# Patient Record
Sex: MALE | Race: WHITE
[De-identification: names, ages, dates, MRNs, and addresses within clinical notes are randomized per-mention and may not be internally consistent; named-entity substitution may affect disease eponyms.]

---

## 2019-11-07 NOTE — HP
CC:  Dr. Andrew Mejía; Dr. Lebron Murray; Dr. Cristian Stone *

 

ADMISSION HISTORY AND PHYSICAL:

 

DATE OF ADMISSION:  19

 

ATTENDING SURGEON:  Dr. Lebron Mclean.* (DICTATED BY JONAH WILD)

 

CHIEF COMPLAINT:  Rectal carcinoma.

 

HISTORY OF PRESENT ILLNESS:  This is a 64-year-old male who for at least the 
past year had noted intermittent rectal bleeding.  Beginning about 3 months ago
, he noticed blood more consistently with each bowel movement and some 
accompanying pain.  Of late, pain has been more consistent both with and 
between bowel movements.  He was seen and examined by Dr. Mclean on 10/03/19, 
at which time, rectal exam revealed a right-sided posterolateral fixed mass 
within the rectum.  He underwent colonoscopy on 10/10/19 with Dr. Bazan, 
which confirmed the presence of a right-sided posterolateral partial 
circumferential mass, from which biopsies were taken showing an invasive 
moderately differentiated adenocarcinoma.  CT scan from 10/23/19 showed 
cirrhosis as well as splenomegaly and portosystemic shunt/varices.  Also noted 
were mild diverticulosis and a probable enchondroma in the right femur.  A 
lesion was confirmed in the distal rectum extending to the anus and with some 
sparing on the left side consistent with known carcinoma, one lymph node was 
seen posteriorly in the mesorectum measuring 0.6 cm.  The patient did undergo 
an MRI today but that report is not yet available.  He has been seen by both 
medical and radiation oncologists and was initiated by Dr. Murray on extended- 
release morphine sulfate with improvement.  He was noted to be mildly anemic on 
recent lab work from 10/10/19.  His case was reviewed at [facility] and he has 
been recommended for neoadjuvant chemoradiation therapy, which is planned to 
start on 19.  Dr. Mclean has discussed with him the indications for port 
placement as well as the risks and alternatives.  The patient elected to 
proceed as scheduled with placement of a power port.  The patient's family 
history is negative for colorectal cancer, though was positive for ovarian 
cancer in his mother.

 

PAST MEDICAL HISTORY:

1.  Hepatitis C (apparently with active disease despite two separate treatment 
regimens with interferon. Most recent liver function tests showed normal 
transaminases, though CT findings are noted as above).

2.  Hypertension (currently untreated).

3.  Glaucoma.

4.  Peripheral edema.

5.  Obesity.

 

(The patient has not seen his primary care provider in 3 to 4 years).

 

PAST SURGICAL HISTORY:  Previous surgeries include exploratory laparotomy for 
ruptured appendicitis.

 

CURRENT MEDICATIONS:

1.  Latanoprost 0.005% one drop each eye once daily.

2.  Timolol 0.5% one drop each eye once daily.

 

DRUG ALLERGIES:  None known.

 

FAMILY HISTORY:  As noted above.  Father  from CHF.  No known family 
history of anesthesia problems, bleeding or clotting disorders.

 

SOCIAL HISTORY:  The patient is single and lives alone.  He is employed as a 
. He did smoke up to 1 pack per day many years ago and then 
intermittently until he quit in .  He drinks 2 beers per week.  He denies 
any other recreational drug use.

 

REVIEW OF SYSTEMS:  General:  No recent constitutional symptoms or acute 
illnesses other than described in the HPI.  He states that his weight has been 
relatively stable.  HEENT:  No problems reported.  No recent visual changes.  
Cardiovascular: No chest pain, palpitations, or history of heart murmur.  He 
has been treated for hypertension in the past but not lately.  Respiratory:  No 
recent cough or shortness of breath.  No respiratory complaints.  GI:  As above 
per HPI.  No upper GI complaints.  Apparently active hepatitis C.  :  No 
problems reported. Endocrine:  No diabetes or thyroid dysfunction.  
Musculoskeletal:  No complaints.

 

                               PHYSICAL EXAMINATION

 

GENERAL:  Well-nourished obese male in no acute distress.

 

VITAL SIGNS:  Height 5 feet 9 inches, weight 245 pounds.  Other vital signs per 
nursing.

 

HEENT:  Pupils equal, round, and reactive.  EOMS intact.  No conjunctival 
pallor. Oropharynx:  Teeth in fair repair.  Some missing teeth.  No intraoral 
lesions.

 

NECK:  No lymphadenopathy in the cervical or supraclavicular regions.  No 
thyromegaly or masses.

 

LUNGS:  Clear to auscultation.  No rales or wheezes.

 

HEART:  Regular rate and rhythm.  No murmur noted.

 

ABDOMEN:  Well-healed lower midline incision.  Soft, nontender to palpation.  
No palpable masses or organomegaly, though exam limited by body habitus.

 

BACK:  No spinous process or CVA tenderness.

 

EXTREMITIES:  No obvious edema.  He does have compression stockings in place 
bilaterally per the patient.  No open lesions in the lower extremities.

 

GENITALIA:  Not done.

 

RECTAL:  Not done.

 

NEUROLOGICAL:  Grossly intact.

 

SKIN:  Warm and dry.  No suspicious rashes or lesions noted.

 

 IMPRESSION:  Rectal carcinoma.

 

PLAN:  Power port placement for neoadjuvant chemotherapy.

 

 ____________________________________ JONAH WILD

 

[id number]/[facility] #: [id number]

MTDD

## 2019-11-11 ENCOUNTER — HOSPITAL ENCOUNTER (OUTPATIENT)
Dept: HOSPITAL 25 - OR | Age: 64
Discharge: HOME | End: 2019-11-11
Attending: SURGERY
Payer: COMMERCIAL

## 2019-11-11 VITALS — DIASTOLIC BLOOD PRESSURE: 70 MMHG | SYSTOLIC BLOOD PRESSURE: 139 MMHG

## 2019-11-11 DIAGNOSIS — I10: ICD-10-CM

## 2019-11-11 DIAGNOSIS — B19.20: ICD-10-CM

## 2019-11-11 DIAGNOSIS — C20: Primary | ICD-10-CM

## 2019-11-11 DIAGNOSIS — E66.9: ICD-10-CM

## 2019-11-11 DIAGNOSIS — K74.60: ICD-10-CM

## 2019-11-11 DIAGNOSIS — R60.0: ICD-10-CM

## 2019-11-11 PROCEDURE — 76000 FLUOROSCOPY <1 HR PHYS/QHP: CPT

## 2019-11-11 PROCEDURE — C1788 PORT, INDWELLING, IMP: HCPCS

## 2019-11-12 NOTE — OP
CC:  Cristian Stone MD, Andrew Mejía MD *

 

DATE OF OPERATION:  11/11/19 - [facility]

 

DATE OF BIRTH:  [birth year]

 

SURGEON:  Lebron Mclean MD

 

ASSISTANT:  None.

 

ANESTHESIOLOGIST:  Dr. Kelly.

 

ANESTHESIA:  Local MAC.

 

PRE-OP DIAGNOSIS:  Rectal carcinoma.

 

POST-OP DIAGNOSIS:  Rectal carcinoma.

 

OPERATIVE PROCEDURE:  PowerPort placement, left subclavian.

 

ESTIMATED BLOOD LOSS:  Minimal.

 

IV FLUIDS:  Crystalloid.

 

SPECIMEN:  None.

 

DRAINS:  None.

 

COMPLICATIONS:  None.

 

COUNTS:  The instrument, needle, and sponge counts were correct.

 

DESCRIPTION OF PROCEDURE:  The patient was brought to the operating room and 
placed on the table supine.  Sequential compression devices were placed in both 
lower extremities.  Intravenous sedation was administered.  He was positioned 
and padded appropriately.  He was prepped and draped in the usual sterile 
fashion.  He received appropriate intravenous antibiotics.  A time-out was 
performed.

 

Local anesthetic was infiltrated into the skin and soft tissue prior to making 
each incision.  Local anesthetic was infiltrated for a left subclavian 
approach.  The patient was positioned in Trendelenburg and then using an 18-
gauge needle, the left subclavian vein was accessed on the first pass.  The 
guidewire was advanced into the superior vena cava under fluoroscopic guidance.
  The additional local anesthetic was infiltrated to the upper left chest and a 
transverse incision was created and using cautery dissection, a subcutaneous 
pocket was created.  A counterincision was made at the guidewire insertion site 
and then an 8-[place]aman[place] PowerPort catheter was back tunneled to the pocket.  A peel
-away sheath and dilator were advanced over the guidewire into the superior 
vena cava under fluoroscopic guidance.  Dilator and the guidewire were removed 
and then the catheter tip was advanced to the atriocaval junction.  A peel-away 
sheath was removed.  The catheter tip was again confirmed prior to cutting the 
catheter to appropriate length, which was then connected to the PowerPort and 
placed into the subcutaneous pocket.  It was drawn and flushed without 
difficulty.  The port was secured into the pocket with 2 sutures of 2-0 
Prolene.  The pocket was closed with two layers using 3-0 Vicryl for the 
subcutaneous tissues, 4-0 Monocryl for the skin.  Counterincision was closed 
with 4-0 Monocryl.  The port was accessed.  It was flushed with heparinized 
saline.  Steri-Strips were applied.  The access needle was placed and then 
Tegaderm dressing was placed over this.  The patient tolerated the procedure 
well and was awakened and transferred to Recovery stable.

 

 [id number]/[facility] #: [id number]

ZOILA

## 2020-04-08 ENCOUNTER — HOSPITAL ENCOUNTER (INPATIENT)
Dept: HOSPITAL 25 - CHOA | Age: 65
LOS: 7 days | Discharge: HOME | DRG: 391 | End: 2020-04-15
Attending: INTERNAL MEDICINE | Admitting: INTERNAL MEDICINE
Payer: MEDICARE

## 2020-04-08 DIAGNOSIS — C20: ICD-10-CM

## 2020-04-08 DIAGNOSIS — E87.70: ICD-10-CM

## 2020-04-08 DIAGNOSIS — Z79.899: ICD-10-CM

## 2020-04-08 DIAGNOSIS — K74.60: ICD-10-CM

## 2020-04-08 DIAGNOSIS — E66.9: ICD-10-CM

## 2020-04-08 DIAGNOSIS — K92.2: ICD-10-CM

## 2020-04-08 DIAGNOSIS — D61.810: ICD-10-CM

## 2020-04-08 DIAGNOSIS — K52.89: ICD-10-CM

## 2020-04-08 DIAGNOSIS — T45.1X5A: ICD-10-CM

## 2020-04-08 DIAGNOSIS — A09: Primary | ICD-10-CM

## 2020-04-08 DIAGNOSIS — Z92.21: ICD-10-CM

## 2020-04-08 DIAGNOSIS — E87.6: ICD-10-CM

## 2020-04-08 DIAGNOSIS — R18.8: ICD-10-CM
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
## 2020-04-08 DIAGNOSIS — D50.9: ICD-10-CM

## 2020-04-08 DIAGNOSIS — I10: ICD-10-CM

## 2020-04-08 DIAGNOSIS — Z92.3: ICD-10-CM

## 2020-04-08 DIAGNOSIS — E83.42: ICD-10-CM

## 2020-04-08 DIAGNOSIS — I73.9: ICD-10-CM

## 2020-04-08 DIAGNOSIS — H40.9: ICD-10-CM

## 2020-04-08 LAB
ALBUMIN SERPL BCG-MCNC: 3 G/DL (ref 3.2–5.2)
ALBUMIN/GLOB SERPL: 1 {RATIO} (ref 1–3)
ALP SERPL-CCNC: 119 U/L (ref 34–104)
ALT SERPL W P-5'-P-CCNC: 34 U/L (ref 7–52)
ANION GAP SERPL CALC-SCNC: 5 MMOL/L (ref 2–11)
AST SERPL-CCNC: 43 U/L (ref 13–39)
BASOPHILS # BLD AUTO: 0 10^3/UL (ref 0–0.2)
BUN SERPL-MCNC: 22 MG/DL (ref 6–24)
BUN/CREAT SERPL: 28.2 (ref 8–20)
CALCIUM SERPL-MCNC: 8.7 MG/DL (ref 8.6–10.3)
CHLORIDE SERPL-SCNC: 105 MMOL/L (ref 101–111)
EOSINOPHIL # BLD AUTO: 0.1 10^3/UL (ref 0–0.6)
FERRITIN SERPL IA-MCNC: 75.5 NG/ML (ref 24–336)
GLOBULIN SER CALC-MCNC: 3.1 G/DL (ref 2–4)
GLUCOSE SERPL-MCNC: 172 MG/DL (ref 70–100)
HCO3 SERPL-SCNC: 24 MMOL/L (ref 22–32)
HCT VFR BLD AUTO: 25 % (ref 42–52)
HGB BLD-MCNC: 8.3 G/DL (ref 14–18)
IRON SERPL-MCNC: 39 UG/DL (ref 50–212)
LYMPHOCYTES # BLD AUTO: 0.4 10^3/UL (ref 1–4.8)
MAGNESIUM SERPL-MCNC: 1.9 MG/DL (ref 1.9–2.7)
MCH RBC QN AUTO: 33 PG (ref 27–31)
MCHC RBC AUTO-ENTMCNC: 34 G/DL (ref 31–36)
MCV RBC AUTO: 99 FL (ref 80–94)
MONOCYTES # BLD AUTO: 0.9 10^3/UL (ref 0–0.8)
NEUTROPHILS # BLD AUTO: 0.7 10^3/UL (ref 1.5–7.7)
NRBC # BLD AUTO: 0 10^3/UL
NRBC BLD QL AUTO: 2
PLATELET # BLD AUTO: 142 10^3/UL (ref 150–450)
POTASSIUM SERPL-SCNC: 4 MMOL/L (ref 3.5–5)
PROT SERPL-MCNC: 6.1 G/DL (ref 6.4–8.9)
RBC # BLD AUTO: 2.48 10^6 /UL (ref 4.18–5.48)
SODIUM SERPL-SCNC: 134 MMOL/L (ref 135–145)
TIBC SERPL-MCNC: 283 MCG/DL (ref 250–450)
TRANSFERRIN SERPL-MCNC: 202 MG/DL (ref 203–362)
WBC # BLD AUTO: 2.1 10^3/UL (ref 3.5–10.8)

## 2020-04-08 PROCEDURE — 86850 RBC ANTIBODY SCREEN: CPT

## 2020-04-08 PROCEDURE — 87046 STOOL CULTR AEROBIC BACT EA: CPT

## 2020-04-08 PROCEDURE — 87045 FECES CULTURE AEROBIC BACT: CPT

## 2020-04-08 PROCEDURE — 83550 IRON BINDING TEST: CPT

## 2020-04-08 PROCEDURE — 99232 SBSQ HOSP IP/OBS MODERATE 35: CPT

## 2020-04-08 PROCEDURE — 80051 ELECTROLYTE PANEL: CPT

## 2020-04-08 PROCEDURE — 82272 OCCULT BLD FECES 1-3 TESTS: CPT

## 2020-04-08 PROCEDURE — 82728 ASSAY OF FERRITIN: CPT

## 2020-04-08 PROCEDURE — 99233 SBSQ HOSP IP/OBS HIGH 50: CPT

## 2020-04-08 PROCEDURE — 74177 CT ABD & PELVIS W/CONTRAST: CPT

## 2020-04-08 PROCEDURE — 87899 AGENT NOS ASSAY W/OPTIC: CPT

## 2020-04-08 PROCEDURE — 83540 ASSAY OF IRON: CPT

## 2020-04-08 PROCEDURE — 83735 ASSAY OF MAGNESIUM: CPT

## 2020-04-08 PROCEDURE — 71260 CT THORAX DX C+: CPT

## 2020-04-08 PROCEDURE — 86900 BLOOD TYPING SEROLOGIC ABO: CPT

## 2020-04-08 PROCEDURE — 86901 BLOOD TYPING SEROLOGIC RH(D): CPT

## 2020-04-08 PROCEDURE — 87040 BLOOD CULTURE FOR BACTERIA: CPT

## 2020-04-08 PROCEDURE — 72197 MRI PELVIS W/O & W/DYE: CPT

## 2020-04-08 PROCEDURE — A9579 GAD-BASE MR CONTRAST NOS,1ML: HCPCS

## 2020-04-08 PROCEDURE — 99239 HOSP IP/OBS DSCHRG MGMT >30: CPT

## 2020-04-08 PROCEDURE — 87493 C DIFF AMPLIFIED PROBE: CPT

## 2020-04-08 PROCEDURE — 99223 1ST HOSP IP/OBS HIGH 75: CPT

## 2020-04-08 PROCEDURE — 80048 BASIC METABOLIC PNL TOTAL CA: CPT

## 2020-04-08 PROCEDURE — P9040 RBC LEUKOREDUCED IRRADIATED: HCPCS

## 2020-04-08 PROCEDURE — 80053 COMPREHEN METABOLIC PANEL: CPT

## 2020-04-08 PROCEDURE — 36415 COLL VENOUS BLD VENIPUNCTURE: CPT

## 2020-04-08 PROCEDURE — 85025 COMPLETE CBC W/AUTO DIFF WBC: CPT

## 2020-04-08 PROCEDURE — 86922 COMPATIBILITY TEST ANTIGLOB: CPT

## 2020-04-08 PROCEDURE — 76705 ECHO EXAM OF ABDOMEN: CPT

## 2020-04-08 RX ADMIN — ENOXAPARIN SODIUM SCH MG: 40 INJECTION SUBCUTANEOUS at 14:36

## 2020-04-08 RX ADMIN — PIPERACILLIN AND TAZOBACTAM SCH MLS/HR: 3; .375 INJECTION, POWDER, LYOPHILIZED, FOR SOLUTION INTRAVENOUS; PARENTERAL at 22:52

## 2020-04-08 RX ADMIN — SODIUM CHLORIDE SCH MLS/HR: 900 IRRIGANT IRRIGATION at 21:37

## 2020-04-08 RX ADMIN — SODIUM CHLORIDE SCH MLS/HR: 900 IRRIGANT IRRIGATION at 13:13

## 2020-04-08 NOTE — CONS
CC:  Dr. Cristian Stone; Primary Care Doctor; [facility]; Dr. Lebron Murray; Dr. Litzy Mejias,
 Colorectal Surgeon, [facility]

 

SURGICAL CONSULTATION REPORT:

 

DATE OF CONSULT:  04/08/20

 

HISTORY OF PRESENT ILLNESS:  I was contacted by the oncology service to evaluate Mr. Mckeon, a 65-year
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
t [facility], who was direct admitted by the oncology service for worsening abdominal pain a
s well as syncope and anemia.  The patient did undergo evaluation by the oncologist earlier today, wh
ich included rectal exam and the patient refused rectal exam today by me.

 

The patient describes last chemotherapy of FOLFOX just over 2 weeks ago, started to feel poorly about
 a week ago on Thursday.  It was accompanied by decreased appetite.  He described pain in the mid abd
omen that persisted, did not have aggravating factors of eating or drinking; however, the patient did
 have decreased intake.

 

Over the weekend, he woke up and had bloody bowel movements that he described as melena, but sound as
 though they had blood clot in them.  It was accompanied with flatus.  Later on, the patient had a sy
ncopal episode.  The patient lives alone. He had woken up later, not sure how long afterwards, incont
inent of urine.  He slowly brought himself up to his bed where he slept it off.  He missed his follow
up appointment with Oncology earlier this week and again was seen today in their offices.

 

The patient describes the abdominal pain as severe.  It does wax and wane, lasting at times for over 
a minute of severe pain where he feels like something is trying to move through a stricture or some t
ightness.  He has had 1 episode of vomiting, intermittent nausea, as well as dry heaves at times.  Th
is past week, he has been passing blood as well as intermittent semi-formed bowel movements.  He is p
assing flatus.  He denies fever, but has been checking his temperature on his home thermometer where 
it has been 96, which he felt is not accurate, but does not state that he had chills or felt warm.

 

The patient had rectal bleeding for many years.  He thought it was secondary to hemorrhoids.  He did 
undergo a sigmoidoscopy in October of last year where he was noted to have a near obstructing bleedin
g rectal lesion.  The patient has been seen by colorectal surgeons where he made it clear that he did
 not want to undergo a colostomy if he did not have to.  He was started on neoadjuvant chemotherapy, 
which continues.  Radiation therapy treatments have been completed, and the patient has no new follow
ups with colorectal surgeon in [place] at this point.

 

The patient denies any hernias.  He lies down for relief of this pain and again it will pass with angella
e.  It is nonradiating.  It is 10/10.

 

PAST MEDICAL HISTORY:  Hepatitis C, cirrhosis, glaucoma, hypertension, obesity, peripheral vascular d
isease.

 

PAST SURGICAL HISTORY:  Exploratory laparotomy, appendectomy for ruptured appendix.

 

MEDICATIONS:  Include:

1.  Amlodipine.

2.  Multivitamin.

 

Again, the FOLFOX completed 2 weeks ago.

 

ALLERGIES:  No known drug allergies.

 

FAMILY HISTORY:  Cancers.

 

SOCIAL HISTORY:  He lives alone.  He does not smoke.  He drinks occasionally.  I do not believe there
 are any surrogate decision makers.

 

REVIEW OF SYSTEMS:  No shortness of breath.  He is tired.  Syncopal event as described.  Abdominal pa
in as described.  No dysuria.  History of significant thrush earlier this year, but not bothering him
 at this time.  No depression.

 

PHYSICAL EXAM:  Temperature 97.4, blood pressure 167/70, heart rate 94, respirations 18, O2 sat of 10
0% on room air.  He is alert, sitting in bed, pale, in mild distress.  Head, ears, eyes, nose, and th
roat:  Normocephalic, atraumatic. Sclerae anicteric.  Mucous membranes dry.  Neck:  No lymphadenopath
y.  Abdomen: Soft, obese.  Minimally tender on deep palpation at the periumbilical site.  Well- heale
d midline incision.  No hernias noted.  No skin changes.  No CVA tenderness. Rectal exam shows no annemarie
lulitis in the perianal area.  The patient refused digital rectal exam, however, today.  Hairless con
sistent with radiation treatment and no protruding mass appreciated.  Extremities with hemosiderin st
aining bilaterally consistent with venous stasis disease.

 

DIAGNOSTIC STUDIES/LAB DATA:  Labs today show white count of 2.1, H and H 8.3/25 which is down from 2
9 just 2 weeks ago, platelet count 142 which is significant improvement than when he was on additiona
l chemotherapy where it was much lower. Glucose 172.  Total bilirubin 1.9.  Albumin 3.0.

 

The patient is drinking for the CAT scan.  No imaging is available at this time.

 

IMPRESSION AND PLAN:  A 65-year-old gentleman with a large rectal lesion on workup last year, with kn
own rectal cancer, undergoing neoadjuvant chemotherapy, who now presents with a week of abdominal petra
n along with anemia, recent syncopal episode. Surgical differential diagnosis, concern for perirectal
 abscesses that can occur with such lesions, although I do not feel this is the diagnosis, although I
 was not able to examine him today as the patient refused.  Partial small bowel obstruction secondary
 to previous surgery and potential mass effect, but this is not something we would operate on at this
 time.  I will look forward to a CAT scan of the abdomen and pelvis.  We are concerned with a near ob
structing lesion and the possibility of proximal perforation.  I do not believe the patient has this 
judging by his abdominal exam today.  He may suffer with dehydration and anemia and subsequent mesent
indiana angina.  My recommendation is hydration, possible blood transfusion, GI prophylaxis, obtain CT s
can and serial abdominal exams, pain control.  I discussed this with the patient.  We also spent a fa
ir amount of time talking today about recommendation for surgical intervention namely APR.  The patie
nt is somewhat fearful of this procedure, but I think our discussion today was somewhat helpful in th
at direction, it has given something else for the patient to think about.  Again, we will continue to
 follow.  We will leave antibiotics at the discretion of the primary team unless something should brooklyn
nge with the findings on the CT scan.

 

 [id number]/[facility] #: [id number]

## 2020-04-09 LAB
ALBUMIN SERPL BCG-MCNC: 2.5 G/DL (ref 3.2–5.2)
ALBUMIN/GLOB SERPL: 0.9 {RATIO} (ref 1–3)
ALP SERPL-CCNC: 100 U/L (ref 34–104)
ALT SERPL W P-5'-P-CCNC: 26 U/L (ref 7–52)
ANION GAP SERPL CALC-SCNC: 5 MMOL/L (ref 2–11)
AST SERPL-CCNC: 31 U/L (ref 13–39)
BASOPHILS # BLD AUTO: 0 10^3/UL (ref 0–0.2)
BUN SERPL-MCNC: 22 MG/DL (ref 6–24)
BUN/CREAT SERPL: 26.8 (ref 8–20)
CALCIUM SERPL-MCNC: 7.9 MG/DL (ref 8.6–10.3)
CHLORIDE SERPL-SCNC: 108 MMOL/L (ref 101–111)
EOSINOPHIL # BLD AUTO: 0.2 10^3/UL (ref 0–0.6)
GLOBULIN SER CALC-MCNC: 2.7 G/DL (ref 2–4)
GLUCOSE SERPL-MCNC: 141 MG/DL (ref 70–100)
HCO3 SERPL-SCNC: 22 MMOL/L (ref 22–32)
HCT VFR BLD AUTO: 21 % (ref 42–52)
HGB BLD-MCNC: 7.1 G/DL (ref 14–18)
LYMPHOCYTES # BLD AUTO: 0.4 10^3/UL (ref 1–4.8)
MAGNESIUM SERPL-MCNC: 1.8 MG/DL (ref 1.9–2.7)
MCH RBC QN AUTO: 34 PG (ref 27–31)
MCHC RBC AUTO-ENTMCNC: 34 G/DL (ref 31–36)
MCV RBC AUTO: 98 FL (ref 80–94)
MONOCYTES # BLD AUTO: 0.9 10^3/UL (ref 0–0.8)
NEUTROPHILS # BLD AUTO: 0.5 10^3/UL (ref 1.5–7.7)
NRBC # BLD AUTO: 0 10^3/UL
NRBC BLD QL AUTO: 1
PLATELET # BLD AUTO: 128 10^3/UL (ref 150–450)
POTASSIUM SERPL-SCNC: 3.6 MMOL/L (ref 3.5–5)
PROT SERPL-MCNC: 5.2 G/DL (ref 6.4–8.9)
RBC # BLD AUTO: 2.13 10^6 /UL (ref 4.18–5.48)
SODIUM SERPL-SCNC: 135 MMOL/L (ref 135–145)
WBC # BLD AUTO: 2.1 10^3/UL (ref 3.5–10.8)

## 2020-04-09 PROCEDURE — 30233N1 TRANSFUSION OF NONAUTOLOGOUS RED BLOOD CELLS INTO PERIPHERAL VEIN, PERCUTANEOUS APPROACH: ICD-10-PCS | Performed by: INTERNAL MEDICINE

## 2020-04-09 RX ADMIN — ENOXAPARIN SODIUM SCH MG: 40 INJECTION SUBCUTANEOUS at 12:16

## 2020-04-09 RX ADMIN — SODIUM CHLORIDE SCH MLS/HR: 900 IRRIGANT IRRIGATION at 15:12

## 2020-04-09 RX ADMIN — SODIUM CHLORIDE SCH MLS/HR: 900 IRRIGANT IRRIGATION at 21:57

## 2020-04-09 RX ADMIN — PIPERACILLIN AND TAZOBACTAM SCH MLS/HR: 3; .375 INJECTION, POWDER, LYOPHILIZED, FOR SOLUTION INTRAVENOUS; PARENTERAL at 06:20

## 2020-04-09 RX ADMIN — PIPERACILLIN AND TAZOBACTAM SCH MLS/HR: 3; .375 INJECTION, POWDER, LYOPHILIZED, FOR SOLUTION INTRAVENOUS; PARENTERAL at 21:58

## 2020-04-09 RX ADMIN — SODIUM CHLORIDE SCH MLS/HR: 900 IRRIGANT IRRIGATION at 04:35

## 2020-04-09 RX ADMIN — PIPERACILLIN AND TAZOBACTAM SCH MLS/HR: 3; .375 INJECTION, POWDER, LYOPHILIZED, FOR SOLUTION INTRAVENOUS; PARENTERAL at 15:13

## 2020-04-09 NOTE — PN
Progress Note





- Progress Note


Date of Service: 04/09/20


SOAP: 


Subjective:


[Feels better than he did yesterday.  Was incontinent of watery stool several 

times overnight, no gross blood or melena however.  Tmax 100.0F at the time he 

reached the floor yesterday.  No n/v.  Hungry and would like to eat.  Abdominal 

pain comes in waves, nothing consistent.]








Objective:


[


Vital Signs:











Temp Pulse Resp BP Pulse Ox


 


 98.7 F   102   16   149/67   100 


 


 04/09/20 08:00  04/09/20 08:00  04/09/20 08:00  04/09/20 08:00  04/09/20 08:00

















Acetaminophen (Tylenol Tab*)  650 mg PO Q4H PRN


   PRN Reason: PAIN - MILD


Enoxaparin Sodium (Lovenox(*))  40 mg SUBCUT Q24H [facility]


   Last Admin: 04/09/20 12:16 Dose:  40 mg


Sodium Chloride (Ns 0.9% 1000 Ml**)  1,000 mls @ 150 mls/hr IV PER RATE [facility]


   Last Admin: 04/09/20 04:35 Dose:  150 mls/hr


Piperacillin Sod/Tazobactam (Sod 3.375 gm/ Sodium Chloride)  100 mls @ 25 mls/

hr IVPB Q8H [facility]


   Last Admin: 04/09/20 06:20 Dose:  25 mls/hr


Ondansetron HCl (Zofran Inj*)  4 mg IV Q4H PRN


   PRN Reason: NAUSEA/VOMITING


Oxycodone/Acetaminophen (Percocet 5/325 Tab*)  1 tab PO Q4H PRN


   PRN Reason: PAIN - MODERATE


Pharmacy Consult (Zosyn Per Pharmacy*)  1 note FOLLOW UP .ZOSYN PER PHARMACY [facility]








 Laboratory Results - last 24 hr











  04/08/20 04/09/20 04/09/20





  10:30 06:03 06:14


 


WBC    2.1 L


 


RBC    2.13 L


 


Hgb    7.1 L


 


Hct    21 L


 


MCV    98 H


 


MCH    34 H


 


MCHC    34


 


RDW    21 H


 


Plt Count    128 L


 


MPV    8.5


 


Neut % (Auto)    26.3


 


Lymph % (Auto)    18.1


 


Mono % (Auto)    43.5


 


Eos % (Auto)    11.2


 


Baso % (Auto)    0.9


 


Absolute Neuts (auto)    0.5 L


 


Absolute Lymphs (auto)    0.4 L


 


Absolute Monos (auto)    0.9 H


 


Absolute Eos (auto)    0.2


 


Absolute Basos (auto)    0.0


 


Absolute Nucleated RBC    0.0


 


Nucleated RBC %    1.0


 


Sodium   135 


 


Potassium   3.6 


 


Chloride   108 


 


Carbon Dioxide   22 


 


Anion Gap   5 


 


BUN   22 


 


Creatinine   0.82 


 


Est GFR ( Amer)   114.1 


 


Est GFR (Non-Af Amer)   94.3 


 


BUN/Creatinine Ratio   26.8 H 


 


Glucose   141 H 


 


Calcium   7.9 L 


 


Magnesium   1.8 L 


 


Total Bilirubin   1.60 H 


 


AST   31 


 


ALT   26 


 


Alkaline Phosphatase   100 


 


Total Protein   5.2 L 


 


Albumin   2.5 L 


 


Globulin   2.7 


 


Albumin/Globulin Ratio   0.9 L 


 


Blood Type  O Positive  


 


Antibody Screen   


 


Crossmatch   














  04/09/20





  06:14


 


WBC 


 


RBC 


 


Hgb 


 


Hct 


 


MCV 


 


MCH 


 


MCHC 


 


RDW 


 


Plt Count 


 


MPV 


 


Neut % (Auto) 


 


Lymph % (Auto) 


 


Mono % (Auto) 


 


Eos % (Auto) 


 


Baso % (Auto) 


 


Absolute Neuts (auto) 


 


Absolute Lymphs (auto) 


 


Absolute Monos (auto) 


 


Absolute Eos (auto) 


 


Absolute Basos (auto) 


 


Absolute Nucleated RBC 


 


Nucleated RBC % 


 


Sodium 


 


Potassium 


 


Chloride 


 


Carbon Dioxide 


 


Anion Gap 


 


BUN 


 


Creatinine 


 


Est GFR ( Amer) 


 


Est GFR (Non-Af Amer) 


 


BUN/Creatinine Ratio 


 


Glucose 


 


Calcium 


 


Magnesium 


 


Total Bilirubin 


 


AST 


 


ALT 


 


Alkaline Phosphatase 


 


Total Protein 


 


Albumin 


 


Globulin 


 


Albumin/Globulin Ratio 


 


Blood Type  O Positive


 


Antibody Screen  Negative


 


Crossmatch  See Detail








Exam


Gen: ill appearing 64 yo male in NAD


HEENT: MMM


CV: RRR, III/VI murmur


Resp: CTA


Abd: midly distended but nonTTP


Ext: no edema]








Assessment:


[This is a 64 yo male with rectal CA currently receiving neoadjuvant FOLFOX who 

presented yesterday to the oncology suite after several days of BRBPR and 

melena with associated abdominal pain.  CT demonstrates mural wall thickening 

of the ascending and transverse colon c/w a colitis.  Ddx. infectious colitis, 

neutropenic colitis (typhilitis).  He remains neutropenic, but afebrile.  Now 

day 18 of C4.  ]








Plan:


[1. Colitis


- surgical consultation is appreciated


- eval for C.diff and send for stool culture to identify alternative pathogens


- cont Zosyn and IVF


- advance to clear liquid diet


2. Pancytopenia secondary to chemotherapy


- his degree of neuropenia is late for FOLFOX, but likely associated with his 

acute illness - hold on initiating GCSF at this time but will consider if 

fevers develop or neutropenia is prolonged


- degree of anemia is certainly related to bleeding from the colon, drop 

overnight likely due to dilution from IVF - give 1UPRBCs today


3. Rectal CA


- s/p C4 neoadjuvant FOLFOX following 5FU/XRT which was c/b grade 3 mucositis





Dispo: requires continued inpatient care]

## 2020-04-09 NOTE — PN
Progress Note





- Progress Note


Date of Service: 04/09/20


Note: 





Surgery Progress Note








S: Hebert is feeling better this morning than yesterday.  His abdominal pain 

has improved.  He is having more watery diarrhea however.  





O:


 Vital Signs - 24 hr











  04/08/20 04/08/20 04/08/20





  13:15 16:08 16:10


 


Temperature 100.0 F  97.4 F


 


Pulse Rate 99  94


 


Respiratory 16 16 18





Rate   


 


Blood Pressure 146/70  167/70





(mmHg)   


 


O2 Sat by Pulse 100  100





Oximetry   














  04/08/20 04/08/20 04/08/20





  19:41 20:08 20:50


 


Temperature  98.1 F 


 


Pulse Rate  95 


 


Respiratory 18 18 18





Rate   


 


Blood Pressure  136/67 





(mmHg)   


 


O2 Sat by Pulse  99 





Oximetry   














  04/08/20 04/08/20 04/09/20





  21:05 23:01 01:20


 


Temperature  97.9 F 


 


Pulse Rate  95 


 


Respiratory 18 18 18





Rate   


 


Blood Pressure  135/70 





(mmHg)   


 


O2 Sat by Pulse  100 





Oximetry   














  04/09/20 04/09/20





  04:19 08:00


 


Temperature 98.0 F 98.7 F


 


Pulse Rate 98 102


 


Respiratory 18 16





Rate  


 


Blood Pressure 111/49 149/67





(mmHg)  


 


O2 Sat by Pulse 96 100





Oximetry  








 Intake & Output











 04/08/20 04/09/20 04/09/20





 22:59 06:59 14:59


 


Intake Total 1086 1083 


 


Output Total  300 0


 


Balance 1086 783 0


 


Weight 217 lb  


 


Intake:   


 


  IV Fluids 1086 980 


 


    ABX - PIPERACILLIN 106  


 


    NS (0.9%) 980 980 


 


  IVPB  103 


 


    ABX - PIPERACILLIN  103 


 


  Oral 0  


 


Output:   


 


  Urine  300 0


 


Other:   


 


  Estimated Stool Amount Medium  








 Laboratory Results - last 24 hr











  04/08/20 04/09/20 04/09/20





  10:30 06:03 06:14


 


WBC    2.1 L


 


RBC    2.13 L


 


Hgb    7.1 L


 


Hct    21 L


 


MCV    98 H


 


MCH    34 H


 


MCHC    34


 


RDW    21 H


 


Plt Count    128 L


 


MPV    8.5


 


Neut % (Auto)    26.3


 


Lymph % (Auto)    18.1


 


Mono % (Auto)    43.5


 


Eos % (Auto)    11.2


 


Baso % (Auto)    0.9


 


Absolute Neuts (auto)    0.5 L


 


Absolute Lymphs (auto)    0.4 L


 


Absolute Monos (auto)    0.9 H


 


Absolute Eos (auto)    0.2


 


Absolute Basos (auto)    0.0


 


Absolute Nucleated RBC    0.0


 


Nucleated RBC %    1.0


 


Sodium  134 L  135 


 


Potassium  4.0  3.6 


 


Chloride  105  108 


 


Carbon Dioxide  24  22 


 


Anion Gap  5  5 


 


BUN  22  22 


 


Creatinine  0.78  0.82 


 


Est GFR ( Amer)  120.9  114.1 


 


Est GFR (Non-Af Amer)  99.9  94.3 


 


BUN/Creatinine Ratio  28.2 H  26.8 H 


 


Glucose  172 H  141 H 


 


Calcium  8.7  7.9 L 


 


Magnesium  1.9  1.8 L 


 


Iron  39 L  


 


TIBC  283  


 


% Saturation  14 L  


 


Unsat Iron Binding  < 268  


 


Transferrin  202 L  


 


Ferritin  75.5  


 


Total Bilirubin  1.90 H  1.60 H 


 


AST  43 H  31 


 


ALT  34  26 


 


Alkaline Phosphatase  119 H  100 


 


Total Protein  6.1 L  5.2 L 


 


Albumin  3.0 L  2.5 L 


 


Globulin  3.1  2.7 


 


Albumin/Globulin Ratio  1.0  0.9 L 











Physical exam:


General- frail appearing man lying in bed, no apparent distress


Abdomen- obese, no tenderness to deep palpation


Ext- no edema





A/P: 65 M with rectal cancer, sp cycle 4 of FOLFOX over 2 weeks ago who was 

admitted by Dr. Stone from the office yesterday for worsening fatigue, weakness, 

abdominal pain.





- I have reviewed patient's imaging from yesterday, including gallbladder US 

and CT abdomen/pelvis.   On US he has a thickened, non distended gallbladder 

with no stones and on CT abdomen he has new onset ascites, cirrhosis, evidence 

of portal hypertension and long segment colon thickening concerning for 

colitis.  Most likely patient has been experiencing neutropenic colitis, 

although he is a couple weeks out from his last cycle of FOLFOX.  Today he 

feels improved after resuscitation and IV abx.  Diarrhea is new onset and 

c.diff and stool studies have been ordered by the oncology team.


- Recommend continued abx and starting CLD today


- Patient has also agreed to be transfused 1 PRBC today





I discussed patient today with Gavin from the Heme Onc team.

## 2020-04-10 LAB
ALBUMIN SERPL BCG-MCNC: 2.7 G/DL (ref 3.2–5.2)
ALBUMIN/GLOB SERPL: 0.9 {RATIO} (ref 1–3)
ALP SERPL-CCNC: 101 U/L (ref 34–104)
ALT SERPL W P-5'-P-CCNC: 27 U/L (ref 7–52)
ANION GAP SERPL CALC-SCNC: 5 MMOL/L (ref 2–11)
ANION GAP SERPL CALC-SCNC: 7 MMOL/L (ref 2–11)
AST SERPL-CCNC: 36 U/L (ref 13–39)
BASOPHILS # BLD AUTO: 0 10^3/UL (ref 0–0.2)
BUN SERPL-MCNC: 18 MG/DL (ref 6–24)
BUN/CREAT SERPL: 22.8 (ref 8–20)
CALCIUM SERPL-MCNC: 7.8 MG/DL (ref 8.6–10.3)
CHLORIDE SERPL-SCNC: 109 MMOL/L (ref 101–111)
CHLORIDE SERPL-SCNC: 111 MMOL/L (ref 101–111)
EOSINOPHIL # BLD AUTO: 0.3 10^3/UL (ref 0–0.6)
GLOBULIN SER CALC-MCNC: 2.9 G/DL (ref 2–4)
GLUCOSE SERPL-MCNC: 185 MG/DL (ref 70–100)
HCO3 SERPL-SCNC: 20 MMOL/L (ref 22–32)
HCO3 SERPL-SCNC: 20 MMOL/L (ref 22–32)
HCT VFR BLD AUTO: 24 % (ref 42–52)
HGB BLD-MCNC: 8.1 G/DL (ref 14–18)
LYMPHOCYTES # BLD AUTO: 0.5 10^3/UL (ref 1–4.8)
MAGNESIUM SERPL-MCNC: 1.9 MG/DL (ref 1.9–2.7)
MCH RBC QN AUTO: 33 PG (ref 27–31)
MCHC RBC AUTO-ENTMCNC: 34 G/DL (ref 31–36)
MCV RBC AUTO: 97 FL (ref 80–94)
MONOCYTES # BLD AUTO: 0.8 10^3/UL (ref 0–0.8)
NEUTROPHILS # BLD AUTO: 1.1 10^3/UL (ref 1.5–7.7)
NRBC # BLD AUTO: 0 10^3/UL
NRBC BLD QL AUTO: 1.3
NRBC SPEC MANUAL: 1 (ref 0–0)
PLATELET # BLD AUTO: 128 10^3/UL (ref 150–450)
POLYCHROMASIA BLD QL SMEAR: (no result)
POTASSIUM SERPL-SCNC: 3 MMOL/L (ref 3.5–5)
POTASSIUM SERPL-SCNC: 3.6 MMOL/L (ref 3.5–5)
PROT SERPL-MCNC: 5.6 G/DL (ref 6.4–8.9)
RBC # BLD AUTO: 2.49 10^6 /UL (ref 4.18–5.48)
SODIUM SERPL-SCNC: 136 MMOL/L (ref 135–145)
SODIUM SERPL-SCNC: 136 MMOL/L (ref 135–145)
VARIANT LYMPHS # BLD MANUAL: 1 % (ref 0–6)
WBC # BLD AUTO: 2.7 10^3/UL (ref 3.5–10.8)

## 2020-04-10 RX ADMIN — PIPERACILLIN AND TAZOBACTAM SCH MLS/HR: 3; .375 INJECTION, POWDER, LYOPHILIZED, FOR SOLUTION INTRAVENOUS; PARENTERAL at 20:47

## 2020-04-10 RX ADMIN — POTASSIUM CHLORIDE SCH MLS/HR: 200 INJECTION, SOLUTION INTRAVENOUS at 15:53

## 2020-04-10 RX ADMIN — POTASSIUM CHLORIDE SCH MLS/HR: 200 INJECTION, SOLUTION INTRAVENOUS at 17:22

## 2020-04-10 RX ADMIN — PIPERACILLIN AND TAZOBACTAM SCH MLS/HR: 3; .375 INJECTION, POWDER, LYOPHILIZED, FOR SOLUTION INTRAVENOUS; PARENTERAL at 05:51

## 2020-04-10 RX ADMIN — SODIUM CHLORIDE SCH MLS/HR: 900 IRRIGANT IRRIGATION at 05:12

## 2020-04-10 RX ADMIN — ENOXAPARIN SODIUM SCH MG: 40 INJECTION SUBCUTANEOUS at 13:56

## 2020-04-10 RX ADMIN — PIPERACILLIN AND TAZOBACTAM SCH MLS/HR: 3; .375 INJECTION, POWDER, LYOPHILIZED, FOR SOLUTION INTRAVENOUS; PARENTERAL at 13:55

## 2020-04-10 RX ADMIN — SODIUM CHLORIDE AND POTASSIUM CHLORIDE SCH MLS/HR: 9; 2.98 INJECTION, SOLUTION INTRAVENOUS at 12:03

## 2020-04-10 RX ADMIN — POTASSIUM CHLORIDE SCH MLS/HR: 200 INJECTION, SOLUTION INTRAVENOUS at 18:29

## 2020-04-10 RX ADMIN — POTASSIUM CHLORIDE SCH MLS/HR: 200 INJECTION, SOLUTION INTRAVENOUS at 14:42

## 2020-04-10 NOTE — PN
Progress Note





- Progress Note


Date of Service: 04/10/20


SOAP: 


Subjective:


[Feels much better today.  Abd pain has resolved.  Tolerated full liquid 

breakfast.  Still having freq stools, but more substantive.  C.diff negative. 

Remains afebrile.]








Objective:


[


Vital Signs:











Temp Pulse Resp BP Pulse Ox


 


 98.9 F   94   16   143/64   99 


 


 04/10/20 07:51  04/10/20 07:51  04/10/20 07:51  04/10/20 07:51  04/10/20 07:51

















Acetaminophen (Tylenol Tab*)  650 mg PO Q4H PRN


   PRN Reason: PAIN - MILD


Enoxaparin Sodium (Lovenox(*))  40 mg SUBCUT Q24H [facility]


   Last Admin: 04/09/20 12:16 Dose:  40 mg


Piperacillin Sod/Tazobactam (Sod 3.375 gm/ Sodium Chloride)  100 mls @ 25 mls/

hr IVPB Q8H [facility]


   Last Admin: 04/10/20 05:51 Dose:  25 mls/hr


Potassium Chloride/Sodium Chloride (Ns 0.9% W/ 40 Meq Kcl 1000 Ml*)  1,000 mls 

@ 75 mls/hr IV PER RATE [facility]


Ondansetron HCl (Zofran Inj*)  4 mg IV Q4H PRN


   PRN Reason: NAUSEA/VOMITING


Oxycodone/Acetaminophen (Percocet 5/325 Tab*)  1 tab PO Q4H PRN


   PRN Reason: PAIN - MODERATE


Pharmacy Consult (Zosyn Per Pharmacy*)  1 note FOLLOW UP .ZOSYN PER PHARMACY [facility]








 Laboratory Results - last 24 hr











  04/09/20 04/10/20 04/10/20





  06:14 08:14 08:14


 


WBC   2.7 L 


 


RBC   2.49 L 


 


Hgb   8.1 L 


 


Hct   24 L 


 


MCV   97 H 


 


MCH   33 H 


 


MCHC   34 


 


RDW   21 H 


 


Plt Count   128 L 


 


MPV   8.3 


 


Sodium    136


 


Potassium    3.0 L


 


Chloride    109


 


Carbon Dioxide    20 L


 


Anion Gap    7


 


BUN    18


 


Creatinine    0.79


 


Est GFR ( Amer)    119.1


 


Est GFR (Non-Af Amer)    98.4


 


BUN/Creatinine Ratio    22.8 H


 


Glucose    185 H


 


Calcium    7.8 L


 


Magnesium    1.9


 


Total Bilirubin    1.70 H


 


AST    36


 


ALT    27


 


Alkaline Phosphatase    101


 


Total Protein    5.6 L


 


Albumin    2.7 L


 


Globulin    2.9


 


Albumin/Globulin Ratio    0.9 L


 


Blood Type  O Positive  


 


Antibody Screen  Negative  


 


Crossmatch  See Detail  








Exam


Gen: mildly ill appearing 66 yo male in NAD


HEENT: MMM


CV: RRR, III/VI murmur


Resp: CTA


Abd: midly distended but nonTTP


Ext: no edema]








Assessment:


[This is a 66 yo male with rectal CA currently receiving neoadjuvant FOLFOX who 

presented yesterday to the oncology suite after several days of BRBPR and 

melena with associated abdominal pain.  CT demonstrates mural wall thickening 

of the ascending and transverse colon c/w a colitis.  Ddx. infectious colitis, 

neutropenic colitis (typhilitis).  Now day 19 of C4.  ANC still pending, but 

total WBC increased slightly today.]








Plan:


[1. Colitis


- surgical consultation is appreciated


- neg for C.diff and stool culture pending


- cont Zosyn and IVF


- advanced to full liquid diet, can consider soft foods for dinner


2. Pancytopenia secondary to chemotherapy


- his degree of neuropenia is late for FOLFOX, but likely associated with his 

acute illness - holding on initiating GCSF, but will consider if fevers develop 

or neutropenia is prolonged


- s/p 1U PRBCs yesterday with good Hgb response - will cont to monitor


3. Rectal CA


- s/p C4 neoadjuvant FOLFOX following 5FU/XRT which was c/b grade 3 mucositis





Dispo: requires continued inpatient care, hopeful for dc home in the next 1-2d 

once he can tolerate po intake reliably and ANC >1000]

## 2020-04-10 NOTE — PN
Progress Note





- Progress Note


Date of Service: 04/10/20


Note: 


S: Reports he is feeling much better today. Minimal to no pain, no nausea, 

emesis. Somewhat formed BM passed last night. Passing flatus as well. 

Tolerating diet.





O: 


 











Temp Pulse Resp BP Pulse Ox


 


 98.9 F   94   16   143/64   99 


 


 04/10/20 07:51  04/10/20 07:51  04/10/20 07:51  04/10/20 07:51  04/10/20 07:51








Intake and Output Last 24 Hours











 04/08/20 04/09/20 04/10/20 04/11/20





 06:59 06:59 06:59 06:59


 


Intake Total  2519 6323 


 


Output Total  300 1550 75


 


Balance  2219 4773 -75


 


Weight  217 lb  


 


Intake:    


 


  IV Fluids  2066 3021 


 


    ABX - PIPERACILLIN  106  


 


    NS (0.9%)  1960 2971 


 


    mag   50 


 


  IVPB  103 220 


 


    ABX - PIPERACILLIN  103 220 


 


  Oral  350 2780 


 


  Packed Cells   302 


 


Output:    


 


  Urine  300 600 75


 


  Liquid Stool   950 


 


Other:    


 


  Estimated Void   Large 


 


  Date of Last Bowel   t 





  Movement    


 


  # Bowel Movements   1 1


 


  Estimated Stool Amount  Medium Medium Small


 


  # Voids   1 








Morning labs pending








PEX


General: Alert, in NAD.


HEENT: Oropharynx clear. PERRLA.


Heart: Regular rhythm. Tachycardic. No murmurs, rubs, gallups.


Lungs: CTAB, no WRR.


ABD: BS present. Soft, nontender and nondistended. No guarding or rebound 

tenderness.


Extremities: Distal pulses intact BL. No edema. Calves soft and nontender.








Assessment and plan: 65 M with rectal cancer, admitted for worsening fatigue, 

weakness, ABD pain





A/P: 65 M with rectal cancer, sp cycle 4 of FOLFOX over 2 weeks ago who was 

admitted by Dr. Stone from the office yesterday for worsening fatigue, weakness, 

abdominal pain, likely cause being neutropenic colitis. Recieved 1 PRBC 

yesterday. Continue antibiotics and IV fluids, additionally ambulation and skyler 

stockings. Advanced diet to full liquids.

## 2020-04-11 LAB
ALBUMIN SERPL BCG-MCNC: 2.6 G/DL (ref 3.2–5.2)
ALBUMIN/GLOB SERPL: 0.9 {RATIO} (ref 1–3)
ALP SERPL-CCNC: 100 U/L (ref 34–104)
ALT SERPL W P-5'-P-CCNC: 25 U/L (ref 7–52)
ANION GAP SERPL CALC-SCNC: 5 MMOL/L (ref 2–11)
AST SERPL-CCNC: 35 U/L (ref 13–39)
BASOPHILS # BLD AUTO: 0 10^3/UL (ref 0–0.2)
BUN SERPL-MCNC: 16 MG/DL (ref 6–24)
BUN/CREAT SERPL: 22.5 (ref 8–20)
CALCIUM SERPL-MCNC: 7.8 MG/DL (ref 8.6–10.3)
CHLORIDE SERPL-SCNC: 112 MMOL/L (ref 101–111)
EOSINOPHIL # BLD AUTO: 0.2 10^3/UL (ref 0–0.6)
GLOBULIN SER CALC-MCNC: 2.9 G/DL (ref 2–4)
GLUCOSE SERPL-MCNC: 151 MG/DL (ref 70–100)
HCO3 SERPL-SCNC: 20 MMOL/L (ref 22–32)
HCT VFR BLD AUTO: 23 % (ref 42–52)
HGB BLD-MCNC: 7.8 G/DL (ref 14–18)
LYMPHOCYTES # BLD AUTO: 0.4 10^3/UL (ref 1–4.8)
MCH RBC QN AUTO: 34 PG (ref 27–31)
MCHC RBC AUTO-ENTMCNC: 35 G/DL (ref 31–36)
MCV RBC AUTO: 97 FL (ref 80–94)
MONOCYTES # BLD AUTO: 0.9 10^3/UL (ref 0–0.8)
NEUTROPHILS # BLD AUTO: 1.4 10^3/UL (ref 1.5–7.7)
NRBC # BLD AUTO: 0 10^3/UL
NRBC BLD QL AUTO: 0.3
PLATELET # BLD AUTO: 128 10^3/UL (ref 150–450)
POTASSIUM SERPL-SCNC: 3.5 MMOL/L (ref 3.5–5)
PROT SERPL-MCNC: 5.5 G/DL (ref 6.4–8.9)
RBC # BLD AUTO: 2.33 10^6 /UL (ref 4.18–5.48)
SODIUM SERPL-SCNC: 137 MMOL/L (ref 135–145)
WBC # BLD AUTO: 3 10^3/UL (ref 3.5–10.8)

## 2020-04-11 RX ADMIN — SODIUM CHLORIDE AND POTASSIUM CHLORIDE SCH MLS/HR: 9; 2.98 INJECTION, SOLUTION INTRAVENOUS at 01:40

## 2020-04-11 RX ADMIN — HEPARIN SCH ML: 100 SYRINGE at 15:10

## 2020-04-11 RX ADMIN — PIPERACILLIN AND TAZOBACTAM SCH MLS/HR: 3; .375 INJECTION, POWDER, LYOPHILIZED, FOR SOLUTION INTRAVENOUS; PARENTERAL at 14:22

## 2020-04-11 RX ADMIN — PIPERACILLIN AND TAZOBACTAM SCH MLS/HR: 3; .375 INJECTION, POWDER, LYOPHILIZED, FOR SOLUTION INTRAVENOUS; PARENTERAL at 01:40

## 2020-04-11 RX ADMIN — PIPERACILLIN AND TAZOBACTAM SCH MLS/HR: 3; .375 INJECTION, POWDER, LYOPHILIZED, FOR SOLUTION INTRAVENOUS; PARENTERAL at 19:58

## 2020-04-11 RX ADMIN — PIPERACILLIN AND TAZOBACTAM SCH MLS/HR: 3; .375 INJECTION, POWDER, LYOPHILIZED, FOR SOLUTION INTRAVENOUS; PARENTERAL at 07:55

## 2020-04-11 RX ADMIN — ENOXAPARIN SODIUM SCH MG: 40 INJECTION SUBCUTANEOUS at 12:58

## 2020-04-11 RX ADMIN — HEPARIN SCH ML: 100 SYRINGE at 20:42

## 2020-04-11 NOTE — PN
Progress Note





- Progress Note


Date of Service: 04/11/20


SOAP: 


Subjective:


Pt seen and examined.  Continues to feel better.  some flatus, formed BM, 

bloated


Ambulating





Objective:





 











Temp Pulse Resp BP Pulse Ox


 


 98.0 F   103   20   154/83   97 


 


 04/11/20 07:41  04/11/20 07:41  04/11/20 07:41  04/11/20 07:41  04/11/20 07:41





a and o x3, nad


lungs b/l base crackles


abdo: distended, atympanic, NT


edema throughout LE








Assessment:


colitis- improving with abx











Plan:


No surgical intervention


Lasix recommended


continue abx


again we discussed need to get to colorectal surgeon.

## 2020-04-12 LAB
ANION GAP SERPL CALC-SCNC: 5 MMOL/L (ref 2–11)
BASOPHILS # BLD AUTO: 0 10^3/UL (ref 0–0.2)
BUN SERPL-MCNC: 12 MG/DL (ref 6–24)
BUN/CREAT SERPL: 17.4 (ref 8–20)
CALCIUM SERPL-MCNC: 7.7 MG/DL (ref 8.6–10.3)
CHLORIDE SERPL-SCNC: 109 MMOL/L (ref 101–111)
EOSINOPHIL # BLD AUTO: 0.2 10^3/UL (ref 0–0.6)
GLUCOSE SERPL-MCNC: 124 MG/DL (ref 70–100)
HCO3 SERPL-SCNC: 23 MMOL/L (ref 22–32)
HCT VFR BLD AUTO: 23 % (ref 42–52)
HGB BLD-MCNC: 7.6 G/DL (ref 14–18)
LYMPHOCYTES # BLD AUTO: 0.4 10^3/UL (ref 1–4.8)
MCH RBC QN AUTO: 32 PG (ref 27–31)
MCHC RBC AUTO-ENTMCNC: 34 G/DL (ref 31–36)
MCV RBC AUTO: 97 FL (ref 80–94)
MONOCYTES # BLD AUTO: 0.7 10^3/UL (ref 0–0.8)
NEUTROPHILS # BLD AUTO: 1.7 10^3/UL (ref 1.5–7.7)
NRBC # BLD AUTO: 0 10^3/UL
NRBC BLD QL AUTO: 0.3
PLATELET # BLD AUTO: 105 10^3/UL (ref 150–450)
POTASSIUM SERPL-SCNC: 3 MMOL/L (ref 3.5–5)
RBC # BLD AUTO: 2.36 10^6 /UL (ref 4.18–5.48)
SODIUM SERPL-SCNC: 137 MMOL/L (ref 135–145)
WBC # BLD AUTO: 3 10^3/UL (ref 3.5–10.8)

## 2020-04-12 RX ADMIN — ENOXAPARIN SODIUM SCH MG: 40 INJECTION SUBCUTANEOUS at 13:48

## 2020-04-12 RX ADMIN — PIPERACILLIN AND TAZOBACTAM SCH MLS/HR: 3; .375 INJECTION, POWDER, LYOPHILIZED, FOR SOLUTION INTRAVENOUS; PARENTERAL at 13:44

## 2020-04-12 RX ADMIN — PIPERACILLIN AND TAZOBACTAM SCH MLS/HR: 3; .375 INJECTION, POWDER, LYOPHILIZED, FOR SOLUTION INTRAVENOUS; PARENTERAL at 08:42

## 2020-04-12 RX ADMIN — POTASSIUM CHLORIDE SCH MLS/HR: 200 INJECTION, SOLUTION INTRAVENOUS at 14:59

## 2020-04-12 RX ADMIN — POTASSIUM CHLORIDE SCH MLS/HR: 200 INJECTION, SOLUTION INTRAVENOUS at 17:53

## 2020-04-12 RX ADMIN — PIPERACILLIN AND TAZOBACTAM SCH MLS/HR: 3; .375 INJECTION, POWDER, LYOPHILIZED, FOR SOLUTION INTRAVENOUS; PARENTERAL at 02:36

## 2020-04-12 RX ADMIN — HEPARIN SCH ML: 100 SYRINGE at 03:23

## 2020-04-12 RX ADMIN — PIPERACILLIN AND TAZOBACTAM SCH MLS/HR: 3; .375 INJECTION, POWDER, LYOPHILIZED, FOR SOLUTION INTRAVENOUS; PARENTERAL at 19:46

## 2020-04-12 RX ADMIN — HEPARIN SCH ML: 100 SYRINGE at 21:34

## 2020-04-12 RX ADMIN — POTASSIUM CHLORIDE SCH MEQ: 1500 TABLET, EXTENDED RELEASE ORAL at 20:38

## 2020-04-12 RX ADMIN — HEPARIN SCH: 100 SYRINGE at 08:42

## 2020-04-12 RX ADMIN — POTASSIUM CHLORIDE SCH MEQ: 1500 TABLET, EXTENDED RELEASE ORAL at 11:34

## 2020-04-13 LAB
ANION GAP SERPL CALC-SCNC: 5 MMOL/L (ref 2–11)
BUN SERPL-MCNC: 8 MG/DL (ref 6–24)
BUN/CREAT SERPL: 11.8 (ref 8–20)
CALCIUM SERPL-MCNC: 7.8 MG/DL (ref 8.6–10.3)
CHLORIDE SERPL-SCNC: 107 MMOL/L (ref 101–111)
GLUCOSE SERPL-MCNC: 138 MG/DL (ref 70–100)
HCO3 SERPL-SCNC: 25 MMOL/L (ref 22–32)
MAGNESIUM SERPL-MCNC: 1.4 MG/DL (ref 1.9–2.7)
POTASSIUM SERPL-SCNC: 2.9 MMOL/L (ref 3.5–5)
SODIUM SERPL-SCNC: 137 MMOL/L (ref 135–145)

## 2020-04-13 RX ADMIN — POTASSIUM CHLORIDE SCH MLS/HR: 200 INJECTION, SOLUTION INTRAVENOUS at 16:41

## 2020-04-13 RX ADMIN — ENOXAPARIN SODIUM SCH MG: 40 INJECTION SUBCUTANEOUS at 14:30

## 2020-04-13 RX ADMIN — PIPERACILLIN AND TAZOBACTAM SCH MLS/HR: 3; .375 INJECTION, POWDER, LYOPHILIZED, FOR SOLUTION INTRAVENOUS; PARENTERAL at 14:30

## 2020-04-13 RX ADMIN — POTASSIUM CHLORIDE SCH MLS/HR: 200 INJECTION, SOLUTION INTRAVENOUS at 11:59

## 2020-04-13 RX ADMIN — POTASSIUM CHLORIDE SCH MEQ: 1500 TABLET, EXTENDED RELEASE ORAL at 20:36

## 2020-04-13 RX ADMIN — PIPERACILLIN AND TAZOBACTAM SCH MLS/HR: 3; .375 INJECTION, POWDER, LYOPHILIZED, FOR SOLUTION INTRAVENOUS; PARENTERAL at 09:05

## 2020-04-13 RX ADMIN — HEPARIN SCH ML: 100 SYRINGE at 03:04

## 2020-04-13 RX ADMIN — HEPARIN SCH ML: 100 SYRINGE at 05:40

## 2020-04-13 RX ADMIN — POTASSIUM CHLORIDE SCH MEQ: 1500 TABLET, EXTENDED RELEASE ORAL at 09:21

## 2020-04-13 RX ADMIN — POTASSIUM CHLORIDE SCH MLS/HR: 200 INJECTION, SOLUTION INTRAVENOUS at 17:57

## 2020-04-13 RX ADMIN — PIPERACILLIN AND TAZOBACTAM SCH MLS/HR: 3; .375 INJECTION, POWDER, LYOPHILIZED, FOR SOLUTION INTRAVENOUS; PARENTERAL at 20:18

## 2020-04-13 RX ADMIN — HEPARIN SCH: 100 SYRINGE at 09:31

## 2020-04-13 RX ADMIN — PIPERACILLIN AND TAZOBACTAM SCH MLS/HR: 3; .375 INJECTION, POWDER, LYOPHILIZED, FOR SOLUTION INTRAVENOUS; PARENTERAL at 02:07

## 2020-04-13 RX ADMIN — POTASSIUM CHLORIDE SCH MLS/HR: 200 INJECTION, SOLUTION INTRAVENOUS at 15:31

## 2020-04-13 RX ADMIN — HEPARIN SCH ML: 100 SYRINGE at 23:20

## 2020-04-13 RX ADMIN — IRON SUCROSE SCH MLS/HR: 20 INJECTION, SOLUTION INTRAVENOUS at 09:50

## 2020-04-13 NOTE — PN
Progress Note





- Progress Note


Date of Service: 04/13/20


SOAP: 


Subjective:


[]Doing well today, much better then admission.  Has some continued diarrhea, 

breathing is better but still with extra fluid weight.  He is not in pain. 

Eating well. Still dark stool.











Acetaminophen (Tylenol Tab*)  650 mg PO Q4H PRN


   PRN Reason: PAIN - MILD


Diphenhydramine HCl (Benadryl Po*)  25 mg PO BEDTIME PRN


   PRN Reason: INSOMNIA


Enoxaparin Sodium (Lovenox(*))  40 mg SUBCUT Q24H [facility]


   Last Admin: 04/12/20 13:48 Dose:  40 mg


Heparin Sodium (Porcine) (Heparin Flush Port (Ivad)**)  5 ml FLUSH DAILY [facility]; 

Protocol


   Last Admin: 04/13/20 05:40 Dose:  5 ml


Piperacillin Sod/Tazobactam (Sod 3.375 gm/ Sodium Chloride)  100 mls @ 200 mls/

hr IVPB Q6H [facility]


   Last Admin: 04/13/20 02:07 Dose:  200 mls/hr


Ondansetron HCl (Zofran Inj*)  4 mg IV Q4H PRN


   PRN Reason: NAUSEA/VOMITING


Oxycodone/Acetaminophen (Percocet 5/325 Tab*)  1 tab PO Q4H PRN


   PRN Reason: PAIN - MODERATE


Pharmacy Consult (Zosyn Per Pharmacy*)  1 note FOLLOW UP .ZOSYN PER PHARMACY [facility]


Potassium Chloride (Klor Con Er Tab*)  20 meq PO BID [facility]


   Last Admin: 04/12/20 20:38 Dose:  20 meq








Objective:


[]


 Vital Signs











Temp Pulse Resp BP Pulse Ox


 


 97.8 F   90   18   135/62   97 


 


 04/13/20 03:15  04/13/20 03:15  04/13/20 03:15  04/13/20 03:15  04/13/20 03:15











Exam


Gen: looks better,  64 yo male in NAD


HEENT: MMM, pale


CV: RRR, III/VI murmur


Resp: CTA


Abd: midly distended but nonTTP


Ext: no edema]








Assessment:


[This is a 64 yo male with rectal CA currently receiving neoadjuvant FOLFOX who 

presented with colitis.  Ddx. infectious colitis, neutropenic colitis (

typhilitis).  He has been improving with supportive care, antibiotics.  ]








Plan:


[1. Colitis


- surgical consultation is appreciated


- neg for C.diff and enteric pathogens. 


- cont Zosyn 


- now tolerating full diet w/o increase in diarrhea. 


2. Hematology


- neutropenia improving. 


- Anemia from chemotherapy and LENNOX. IV iron daily, starting now. 


3. FEN.


- Fluid overload, continue Lasix


- Hypokalemia from diarrhea, lasix. PO K and 4 runs IV


- Check Mg and replete


3. Rectal CA. C4 neoadjuvant FOLFOX following 5FU/XRT which was c/b grade 3 

mucositis. Discussed plan of care 


- MRI pelvis today


- No more chemotherapy


- Consultation surgery, suspect will need to be in person. 


4. Disp home but will need stable K and Mg. ]

## 2020-04-14 LAB
ALBUMIN SERPL BCG-MCNC: 2.4 G/DL (ref 3.2–5.2)
ALBUMIN/GLOB SERPL: 0.8 {RATIO} (ref 1–3)
ALP SERPL-CCNC: 103 U/L (ref 34–104)
ALT SERPL W P-5'-P-CCNC: 24 U/L (ref 7–52)
ANION GAP SERPL CALC-SCNC: 2 MMOL/L (ref 2–11)
AST SERPL-CCNC: 42 U/L (ref 13–39)
BASOPHILS # BLD AUTO: 0 10^3/UL (ref 0–0.2)
BUN SERPL-MCNC: 7 MG/DL (ref 6–24)
BUN/CREAT SERPL: 11.7 (ref 8–20)
CALCIUM SERPL-MCNC: 8 MG/DL (ref 8.6–10.3)
CHLORIDE SERPL-SCNC: 108 MMOL/L (ref 101–111)
DACRYOCYTES BLD QL SMEAR: (no result)
EOSINOPHIL # BLD AUTO: 0.1 10^3/UL (ref 0–0.6)
GLOBULIN SER CALC-MCNC: 3 G/DL (ref 2–4)
GLUCOSE SERPL-MCNC: 127 MG/DL (ref 70–100)
HCO3 SERPL-SCNC: 27 MMOL/L (ref 22–32)
HCT VFR BLD AUTO: 22 % (ref 42–52)
HGB BLD-MCNC: 7.4 G/DL (ref 14–18)
LYMPHOCYTES # BLD AUTO: 0.3 10^3/UL (ref 1–4.8)
MAGNESIUM SERPL-MCNC: 1.5 MG/DL (ref 1.9–2.7)
MCH RBC QN AUTO: 32 PG (ref 27–31)
MCHC RBC AUTO-ENTMCNC: 33 G/DL (ref 31–36)
MCV RBC AUTO: 96 FL (ref 80–94)
MONOCYTES # BLD AUTO: 0.5 10^3/UL (ref 0–0.8)
NEUTROPHILS # BLD AUTO: 1.9 10^3/UL (ref 1.5–7.7)
NRBC # BLD AUTO: 0 10^3/UL
NRBC BLD QL AUTO: 0.7
PLATELET # BLD AUTO: 81 10^3/UL (ref 150–450)
POLYCHROMASIA BLD QL SMEAR: (no result)
POTASSIUM SERPL-SCNC: 3 MMOL/L (ref 3.5–5)
PROT SERPL-MCNC: 5.4 G/DL (ref 6.4–8.9)
RBC # BLD AUTO: 2.32 10^6 /UL (ref 4.18–5.48)
SODIUM SERPL-SCNC: 137 MMOL/L (ref 135–145)
WBC # BLD AUTO: 3 10^3/UL (ref 3.5–10.8)

## 2020-04-14 RX ADMIN — IRON SUCROSE SCH MLS/HR: 20 INJECTION, SOLUTION INTRAVENOUS at 09:00

## 2020-04-14 RX ADMIN — HEPARIN SCH ML: 100 SYRINGE at 02:58

## 2020-04-14 RX ADMIN — PIPERACILLIN AND TAZOBACTAM SCH MLS/HR: 3; .375 INJECTION, POWDER, LYOPHILIZED, FOR SOLUTION INTRAVENOUS; PARENTERAL at 14:21

## 2020-04-14 RX ADMIN — POTASSIUM CHLORIDE SCH MLS/HR: 200 INJECTION, SOLUTION INTRAVENOUS at 16:10

## 2020-04-14 RX ADMIN — POTASSIUM CHLORIDE SCH MEQ: 1500 TABLET, EXTENDED RELEASE ORAL at 09:00

## 2020-04-14 RX ADMIN — POTASSIUM CHLORIDE SCH MLS/HR: 200 INJECTION, SOLUTION INTRAVENOUS at 19:38

## 2020-04-14 RX ADMIN — PIPERACILLIN AND TAZOBACTAM SCH MLS/HR: 3; .375 INJECTION, POWDER, LYOPHILIZED, FOR SOLUTION INTRAVENOUS; PARENTERAL at 08:21

## 2020-04-14 RX ADMIN — PIPERACILLIN AND TAZOBACTAM SCH MLS/HR: 3; .375 INJECTION, POWDER, LYOPHILIZED, FOR SOLUTION INTRAVENOUS; PARENTERAL at 20:52

## 2020-04-14 RX ADMIN — POTASSIUM CHLORIDE SCH MEQ: 1500 TABLET, EXTENDED RELEASE ORAL at 21:02

## 2020-04-14 RX ADMIN — POTASSIUM CHLORIDE SCH MLS/HR: 200 INJECTION, SOLUTION INTRAVENOUS at 17:35

## 2020-04-14 RX ADMIN — PIPERACILLIN AND TAZOBACTAM SCH MLS/HR: 3; .375 INJECTION, POWDER, LYOPHILIZED, FOR SOLUTION INTRAVENOUS; PARENTERAL at 01:57

## 2020-04-14 RX ADMIN — ENOXAPARIN SODIUM SCH MG: 40 INJECTION SUBCUTANEOUS at 12:11

## 2020-04-14 RX ADMIN — POTASSIUM CHLORIDE SCH MLS/HR: 200 INJECTION, SOLUTION INTRAVENOUS at 14:58

## 2020-04-14 RX ADMIN — HEPARIN SCH: 100 SYRINGE at 08:54

## 2020-04-14 RX ADMIN — HEPARIN SCH ML: 100 SYRINGE at 06:08

## 2020-04-14 NOTE — PN
Progress Note





- Progress Note


Date of Service: 04/14/20


SOAP: 


Subjective:


[Continuing to improve.  Stools more formed and only ~3 times per day.  No 

abdominal pain.  Feels like he's walking on balloons.  ]








Objective:


[


Vital Signs:











Temp Pulse Resp BP Pulse Ox


 


 97.8 F   85   17   119/65   100 


 


 04/14/20 11:16  04/14/20 11:16  04/14/20 11:16  04/14/20 11:16  04/14/20 11:16

















Acetaminophen (Tylenol Tab*)  650 mg PO Q4H PRN


   PRN Reason: PAIN - MILD


Diphenhydramine HCl (Benadryl Po*)  25 mg PO BEDTIME PRN


   PRN Reason: INSOMNIA


   Last Admin: 04/13/20 23:10 Dose:  25 mg


Enoxaparin Sodium (Lovenox(*))  40 mg SUBCUT Q24H [facility]


   Last Admin: 04/13/20 14:30 Dose:  40 mg


Furosemide (Lasix Iv*)  20 mg IV SLOW PU ONCE ONE


   Stop: 04/14/20 11:38


Heparin Sodium (Porcine) (Heparin Flush Port (Ivad)**)  5 ml FLUSH DAILY Counts include 234 beds at the [facility]; 

Protocol


   Last Admin: 04/14/20 08:54 Dose:  Not Given


Piperacillin Sod/Tazobactam (Sod 3.375 gm/ Sodium Chloride)  100 mls @ 200 mls/

hr IVPB Q6H [facility]


   Last Admin: 04/14/20 08:21 Dose:  200 mls/hr


Iron Sucrose 200 mg/ Sodium (Chloride)  110 mls @ 440 mls/hr IVPB DAILY [facility]


   Stop: 04/18/20 08:59


   Last Admin: 04/14/20 09:00 Dose:  440 mls/hr


Magnesium Sulfate (Magnesium Sulf 4 Gm/100 Ml Iv*)  4,000 mg in 100 mls @ 

33.333 mls/hr IVPB ONCE ONE


   Stop: 04/14/20 12:14


   Last Admin: 04/14/20 11:06 Dose:  33.333 mls/hr


Potassium Chloride (Potassium Chloride 10 Meq/50 Ml Ivpremix*)  10 meq in 50 

mls @ 50 mls/hr IV Q1H [facility]


   Stop: 04/14/20 15:59


Ondansetron HCl (Zofran Inj*)  4 mg IV Q4H PRN


   PRN Reason: NAUSEA/VOMITING


Oxycodone/Acetaminophen (Percocet 5/325 Tab*)  1 tab PO Q4H PRN


   PRN Reason: PAIN - MODERATE


Pharmacy Consult (Zosyn Per Pharmacy*)  1 note FOLLOW UP .ZOSYN PER PHARMACY Counts include 234 beds at the [facility]


Potassium Chloride (Klor Con Er Tab*)  20 meq PO BID [facility]


   Last Admin: 04/14/20 09:00 Dose:  20 meq








 Laboratory Results - last 24 hr











  04/14/20 04/14/20





  06:10 06:10


 


WBC  3.0 L 


 


RBC  2.32 L 


 


Hgb  7.4 L 


 


Hct  22 L 


 


MCV  96 H 


 


MCH  32 H 


 


MCHC  33 


 


RDW  19 H 


 


Plt Count  81 L 


 


MPV  8.2 


 


Neut % (Auto)  65.3 


 


Lymph % (Auto)  11.6 


 


Mono % (Auto)  17.7 


 


Eos % (Auto)  4.5 


 


Baso % (Auto)  0.9 


 


Absolute Neuts (auto)  1.9 


 


Absolute Lymphs (auto)  0.3 L 


 


Absolute Monos (auto)  0.5 


 


Absolute Eos (auto)  0.1 


 


Absolute Basos (auto)  0.0 


 


Absolute Nucleated RBC  0.0 


 


Nucleated RBC %  0.7 


 


Polychromasia  2+ 


 


Anisocytosis  2+ 


 


Macrocytosis  1+ 


 


Tear Drop Cells  1+ 


 


Sodium   137


 


Potassium   3.0 L


 


Chloride   108


 


Carbon Dioxide   27


 


Anion Gap   2


 


BUN   7


 


Creatinine   0.60 L


 


Est GFR ( Amer)   163.6


 


Est GFR (Non-Af Amer)   135.2


 


BUN/Creatinine Ratio   11.7


 


Glucose   127 H


 


Calcium   8.0 L


 


Magnesium   1.5 L


 


Total Bilirubin   0.80


 


AST   42 H


 


ALT   24


 


Alkaline Phosphatase   103


 


Total Protein   5.4 L


 


Albumin   2.4 L


 


Globulin   3.0


 


Albumin/Globulin Ratio   0.8 L








Exam


Gen: chronically ill appearing, but improved 66 yo male in NAD


HEENT: MMM, pale


CV: RRR, III/VI murmur


Resp: CTA


Abd: midly distended but nonTTP


Ext: trace edema diffusely (anasarca)








Assessment:


[This is a 66 yo male with rectal CA currently receiving neoadjuvant FOLFOX who 

presented with colitis.  Ddx. infectious colitis, neutropenic colitis (

typhilitis).  He has been improving with supportive care, antibiotics.  ]








Plan:


[1. Colitis


- surgical consultation is appreciated


- neg for C.diff and enteric pathogens. 


- cont Zosyn 


- now tolerating full diet w/o increase in diarrhea. 


2. Hematology


- neutropenia improving. 


- Anemia from chemotherapy and LENNOX. IV iron daily, starting now. 


- give one additional unit of PRBCs today


3. FEN.


- Fluid overloaded, give additional IV lasix today


- Hypomagnesemia - give additional 4g Mg today


- Hypokalemia - remains deficient - give additional 40 mEq IV today


4. Rectal CA. C4 neoadjuvant FOLFOX following 5FU/XRT which was c/b grade 3 

mucositis. 


- MRI pelvis completed - appears CARMINE


- reinforced plan outlined by Dr Stone yesterday - no additional chemotherapy, 

plan to proceed to surgery which patient is questioning the need for if imaging 

is CARMINE - discussed concern for local/metastatic recurrence





Dispo: anticipate dc home tomorrow.  Giving additional electrolytes and PRBCs 

today

## 2020-04-15 VITALS — SYSTOLIC BLOOD PRESSURE: 134 MMHG | DIASTOLIC BLOOD PRESSURE: 64 MMHG

## 2020-04-15 LAB
ALBUMIN SERPL BCG-MCNC: 2.3 G/DL (ref 3.2–5.2)
ALBUMIN/GLOB SERPL: 0.9 {RATIO} (ref 1–3)
ALP SERPL-CCNC: 106 U/L (ref 34–104)
ALT SERPL W P-5'-P-CCNC: 25 U/L (ref 7–52)
ANION GAP SERPL CALC-SCNC: 3 MMOL/L (ref 2–11)
AST SERPL-CCNC: 47 U/L (ref 13–39)
BASOPHILS # BLD AUTO: 0 10^3/UL (ref 0–0.2)
BUN SERPL-MCNC: 6 MG/DL (ref 6–24)
BUN/CREAT SERPL: 10.2 (ref 8–20)
CALCIUM SERPL-MCNC: 7.7 MG/DL (ref 8.6–10.3)
CHLORIDE SERPL-SCNC: 108 MMOL/L (ref 101–111)
EOSINOPHIL # BLD AUTO: 0.2 10^3/UL (ref 0–0.6)
GLOBULIN SER CALC-MCNC: 2.7 G/DL (ref 2–4)
GLUCOSE SERPL-MCNC: 109 MG/DL (ref 70–100)
HCO3 SERPL-SCNC: 25 MMOL/L (ref 22–32)
HCT VFR BLD AUTO: 24 % (ref 42–52)
HGB BLD-MCNC: 8 G/DL (ref 14–18)
LYMPHOCYTES # BLD AUTO: 0.4 10^3/UL (ref 1–4.8)
MAGNESIUM SERPL-MCNC: 1.8 MG/DL (ref 1.9–2.7)
MCH RBC QN AUTO: 32 PG (ref 27–31)
MCHC RBC AUTO-ENTMCNC: 34 G/DL (ref 31–36)
MCV RBC AUTO: 95 FL (ref 80–94)
MONOCYTES # BLD AUTO: 0.5 10^3/UL (ref 0–0.8)
NEUTROPHILS # BLD AUTO: 2.5 10^3/UL (ref 1.5–7.7)
NRBC # BLD AUTO: 0 10^3/UL
NRBC BLD QL AUTO: 0.4
PLATELET # BLD AUTO: 82 10^3/UL (ref 150–450)
POTASSIUM SERPL-SCNC: 3 MMOL/L (ref 3.5–5)
PROT SERPL-MCNC: 5 G/DL (ref 6.4–8.9)
RBC # BLD AUTO: 2.5 10^6 /UL (ref 4.18–5.48)
SODIUM SERPL-SCNC: 136 MMOL/L (ref 135–145)
WBC # BLD AUTO: 3.6 10^3/UL (ref 3.5–10.8)

## 2020-04-15 RX ADMIN — PIPERACILLIN AND TAZOBACTAM SCH MLS/HR: 3; .375 INJECTION, POWDER, LYOPHILIZED, FOR SOLUTION INTRAVENOUS; PARENTERAL at 08:10

## 2020-04-15 RX ADMIN — POTASSIUM CHLORIDE SCH MLS/HR: 200 INJECTION, SOLUTION INTRAVENOUS at 10:29

## 2020-04-15 RX ADMIN — PIPERACILLIN AND TAZOBACTAM SCH MLS/HR: 3; .375 INJECTION, POWDER, LYOPHILIZED, FOR SOLUTION INTRAVENOUS; PARENTERAL at 13:33

## 2020-04-15 RX ADMIN — ENOXAPARIN SODIUM SCH MG: 40 INJECTION SUBCUTANEOUS at 13:33

## 2020-04-15 RX ADMIN — HEPARIN SCH ML: 100 SYRINGE at 14:17

## 2020-04-15 RX ADMIN — POTASSIUM CHLORIDE SCH MLS/HR: 200 INJECTION, SOLUTION INTRAVENOUS at 12:38

## 2020-04-15 RX ADMIN — HEPARIN SCH ML: 100 SYRINGE at 05:39

## 2020-04-15 RX ADMIN — HEPARIN SCH: 100 SYRINGE at 14:24

## 2020-04-15 RX ADMIN — PIPERACILLIN AND TAZOBACTAM SCH MLS/HR: 3; .375 INJECTION, POWDER, LYOPHILIZED, FOR SOLUTION INTRAVENOUS; PARENTERAL at 03:26

## 2020-04-15 RX ADMIN — POTASSIUM CHLORIDE SCH MLS/HR: 200 INJECTION, SOLUTION INTRAVENOUS at 07:25

## 2020-04-15 RX ADMIN — IRON SUCROSE SCH MLS/HR: 20 INJECTION, SOLUTION INTRAVENOUS at 08:59

## 2020-04-15 RX ADMIN — POTASSIUM CHLORIDE SCH: 200 INJECTION, SOLUTION INTRAVENOUS at 07:26

## 2020-04-15 NOTE — DS
CC:  Dr. Andrew Mejía; Dr. Stone; Dr. Pak*

 

DISCHARGE SUMMARY:

 

DATE OF ADMISSION:  20

 

DATE OF DISCHARGE:  04/15/20

 

PRIMARY CARE PROVIDER:  Dr. Andrew Mejía.

 

PRIMARY ONCOLOGIST AND ATTENDING PHYSICIAN:  Dr. Cristian Stone* (dictated by 
JONAH Badillo).

 

CONSULTING SURGEON:  Dr. Darion Pak.

 

PRIMARY DISCHARGE DIAGNOSES:

1.  Acute colitis - infectious versus neutropenic - testing for enteric 
pathogens including Clostridium difficile was negative during this 
hospitalization.

2.  Pancytopenia secondary to chemotherapy including several days of neutropenia
, all improved at the time of discharge.

3.  Lower gastrointestinal bleed secondary to colitis.

4.  Severe hypokalemia and hypomagnesemia secondary to gastrointestinal losses.

5.  Rectal cancer, status post neoadjuvant 5-FU and concurrent radiation and 4 
cycles of FOLFOX chemotherapy.

 

DISCHARGE MEDICATIONS:

1.  Amlodipine 5 mg p.o. daily.

2.  Multivitamin 1 tablet p.o. daily.

3.  Zofran 4 mg p.o. q.6 hours as needed for nausea and vomiting.

4.  Compazine 5 mg p.o. q.6 hours as needed for nausea or vomiting.

5.  Potassium chloride liquid 40 mEq p.o. twice daily.

 

HOSPITAL IMAGIN.  CT chest, abdomen, and pelvis 20 demonstrates cirrhotic liver with 
evidence for portal hypertension with splenomegaly, varices, and ascites.  
Ascites is new when compared to prior exam.  There is a long segment with mural 
thickening of the cecum, ascending colon, and transverse colon suspicious for 
colitis.

2.  Gallbladder ultrasound 20 shows some gallbladder wall thickening 
measuring up to 9 mm, however, the gallbladder is not abnormally distended and 
no visible cholelithiasis with a negative sonographic Matias sign.

3.  MRI of the pelvis 20 shows gross resolution of previous primary 
rectal tumor as well as no lymphadenopathy by size criteria.  There is a small 
volume of ascites at the pelvis corresponds with CT findings.

 

HOSPITAL COURSE:  This is a 65-year-old gentleman with locally advanced rectal 
cancer, under the care of Dr. Stone, who received neoadjuvant 5-FU and 
concurrent radiation, which was complicated by severe mucositis.  He has since 
completed 4 cycles of neoadjuvant FOLFOX of the planned 8 and presented to the 
oncology office with several days of bloody diarrhea and profound fatigue.  The 
patient was pancytopenic with an initial neutrophil count of 700.  Chemistries 
were largely unremarkable with the exception of an elevated total bilirubin to 
1.9.  The patient subsequently underwent a CT scan of the abdomen and pelvis as 
he was slightly distended and quite tender to palpation.  CT demonstrated some 
ascites, but also significant bowel wall thickening of the ascending and 
transverse colon consistent with a colitis.  He had significant watery diarrhea
, but no gross blood during his hospitalization.  He was empirically treated 
for an infectious colitis.  C. diff testing was negative and no enteric 
pathogens were identified on stool culture.

 

The patient received a total of 2 units of packed red blood cells and was 
supplemented with IV iron for his anemia, which is likely a combination of iron 
deficiency secondary to blood loss as well as chemotherapy induced.  The 
patient improved with the above supportive measures including IV fluids and 
antibiotics. Stools were semiformed at the time of discharge.

 

Extensive discussion regarding recommendations and goals of care were completed 
during this hospitalization.  Recommendations at this time are to discontinue 
neoadjuvant chemotherapy and proceed to resection of the primary tumor.  An MRI 
of the pelvis was completed during this hospitalization, which shows gross 
resolution of the rectal tumor and no lymphadenopathy by size criteria.

 

DISPOSITION AND FOLLOWUP PLAN:  The patient is being discharged to home where 
he lives independently and in stable condition.  He received adequate course of 
antibiotics during his hospitalization and does not require any additional oral 
antibiotics at the time of discharge.  He will follow up with a home draw for 
labs on Friday and an office visit via Telemedicine on Monday.  He is advised 
to call the office with any worsening symptoms.

 

____________________________________ 

JONAH BADILLO

 

[id number]/[facility] #: [id number]

[facility]